# Patient Record
Sex: FEMALE | Race: WHITE | NOT HISPANIC OR LATINO | URBAN - METROPOLITAN AREA
[De-identification: names, ages, dates, MRNs, and addresses within clinical notes are randomized per-mention and may not be internally consistent; named-entity substitution may affect disease eponyms.]

---

## 2017-06-05 ENCOUNTER — IMPORTED ENCOUNTER (OUTPATIENT)
Dept: URBAN - METROPOLITAN AREA CLINIC 50 | Facility: CLINIC | Age: 72
End: 2017-06-05

## 2017-06-05 NOTE — PATIENT DISCUSSION
""""" ""Cataract(s) are not visually significant for cataract surgery.  Monitor by regular examinations. """

## 2018-06-04 ENCOUNTER — IMPORTED ENCOUNTER (OUTPATIENT)
Dept: URBAN - METROPOLITAN AREA CLINIC 50 | Facility: CLINIC | Age: 73
End: 2018-06-04

## 2019-07-15 ENCOUNTER — IMPORTED ENCOUNTER (OUTPATIENT)
Dept: URBAN - METROPOLITAN AREA CLINIC 50 | Facility: CLINIC | Age: 74
End: 2019-07-15

## 2019-07-15 NOTE — PATIENT DISCUSSION
"""Cataract(s) are not visually significant for cataract surgery. Monitor by regular examinations. "" ""Patient is happy with current glasses prescription or uncorrected [facility] and defers an Rx change.  """

## 2020-11-30 ENCOUNTER — IMPORTED ENCOUNTER (OUTPATIENT)
Dept: URBAN - METROPOLITAN AREA CLINIC 50 | Facility: CLINIC | Age: 75
End: 2020-11-30

## 2021-04-18 ASSESSMENT — VISUAL ACUITY
OD_CC: 20/25+2
OS_OTHER: 20/30. 20/40.
OD_OTHER: 20/20.
OD_CC: J1@ 21 IN
OS_CC: J1@ 15 IN
OD_BAT: 20/30
OD_CC: 20/20
OS_CC: J1+
OS_CC: 20/25-1
OD_CC: J1+
OS_BAT: 20/30
OD_OTHER: 20/30. 20/40.
OD_CC: 20/25-1
OS_OTHER: 20/20.
OS_CC: J1+
OD_CC: J1+
OD_CC: 20/25-1
OS_CC: 20/20
OS_CC: J1@ 21 IN
OD_CC: J1@ 15 IN
OS_CC: 20/25
OS_CC: 20/25+2

## 2021-04-18 ASSESSMENT — TONOMETRY
OD_IOP_MMHG: 15
OS_IOP_MMHG: 13
OS_IOP_MMHG: 16
OD_IOP_MMHG: 12
OS_IOP_MMHG: 15
OD_IOP_MMHG: 15
OD_IOP_MMHG: 18
OS_IOP_MMHG: 18

## 2021-11-09 ENCOUNTER — PREPPED CHART (OUTPATIENT)
Dept: URBAN - METROPOLITAN AREA CLINIC 52 | Facility: CLINIC | Age: 76
End: 2021-11-09

## 2022-11-17 ENCOUNTER — NEW PATIENT (OUTPATIENT)
Dept: URBAN - METROPOLITAN AREA CLINIC 52 | Facility: CLINIC | Age: 77
End: 2022-11-17

## 2022-11-17 DIAGNOSIS — H43.813: ICD-10-CM

## 2022-11-17 DIAGNOSIS — H04.123: ICD-10-CM

## 2022-11-17 DIAGNOSIS — H25.13: ICD-10-CM

## 2022-11-17 DIAGNOSIS — H52.4: ICD-10-CM

## 2022-11-17 PROCEDURE — 92014 COMPRE OPH EXAM EST PT 1/>: CPT

## 2022-11-17 PROCEDURE — 92015 DETERMINE REFRACTIVE STATE: CPT

## 2022-11-17 ASSESSMENT — VISUAL ACUITY
OU_CC: 20/25
OD_CC: 20/25
OS_PH: 20/25-1
OS_CC: 20/30-1
OD_GLARE: 20/25
OD_GLARE: 20/40
OS_GLARE: 20/25
OS_GLARE: 20/40

## 2022-11-17 ASSESSMENT — TONOMETRY
OD_IOP_MMHG: 17
OS_IOP_MMHG: 16

## 2022-11-17 ASSESSMENT — KERATOMETRY
OD_K2POWER_DIOPTERS: 44.00
OS_AXISANGLE_DEGREES: 14
OD_K1POWER_DIOPTERS: 43.00
OS_AXISANGLE2_DEGREES: 104
OD_AXISANGLE2_DEGREES: 60
OS_K2POWER_DIOPTERS: 44.25
OS_K1POWER_DIOPTERS: 43.25
OD_AXISANGLE_DEGREES: 150

## 2023-11-27 ENCOUNTER — COMPREHENSIVE EXAM (OUTPATIENT)
Dept: URBAN - METROPOLITAN AREA CLINIC 52 | Facility: CLINIC | Age: 78
End: 2023-11-27

## 2023-11-27 DIAGNOSIS — H25.13: ICD-10-CM

## 2023-11-27 DIAGNOSIS — H35.3131: ICD-10-CM

## 2023-11-27 DIAGNOSIS — H40.013: ICD-10-CM

## 2023-11-27 DIAGNOSIS — H04.123: ICD-10-CM

## 2023-11-27 DIAGNOSIS — H43.813: ICD-10-CM

## 2023-11-27 PROCEDURE — 92134 CPTRZ OPH DX IMG PST SGM RTA: CPT

## 2023-11-27 PROCEDURE — 99214 OFFICE O/P EST MOD 30 MIN: CPT

## 2023-11-27 PROCEDURE — 92015 DETERMINE REFRACTIVE STATE: CPT

## 2023-11-27 ASSESSMENT — VISUAL ACUITY
OS_CC: 20/25
OD_GLARE: 20/25
OD_GLARE: 20/25
OS_GLARE: 20/25
OS_GLARE: 20/25

## 2023-11-27 ASSESSMENT — TONOMETRY
OS_IOP_MMHG: 16
OD_IOP_MMHG: 17
OD_IOP_MMHG: 13
OS_IOP_MMHG: 13

## 2023-11-27 ASSESSMENT — KERATOMETRY
OD_K2POWER_DIOPTERS: 44.00
OS_K2POWER_DIOPTERS: 44.25
OD_AXISANGLE_DEGREES: 150
OS_AXISANGLE_DEGREES: 14
OS_K1POWER_DIOPTERS: 43.25
OD_AXISANGLE2_DEGREES: 60
OD_K1POWER_DIOPTERS: 43.00
OS_AXISANGLE2_DEGREES: 104

## 2024-05-13 ENCOUNTER — DIAGNOSTICS ONLY (OUTPATIENT)
Dept: URBAN - METROPOLITAN AREA CLINIC 52 | Facility: CLINIC | Age: 79
End: 2024-05-13

## 2024-05-13 DIAGNOSIS — H40.013: ICD-10-CM

## 2024-05-13 PROCEDURE — 92133 CPTRZD OPH DX IMG PST SGM ON: CPT

## 2024-05-13 PROCEDURE — 92083 EXTENDED VISUAL FIELD XM: CPT

## 2024-05-13 ASSESSMENT — KERATOMETRY
OS_AXISANGLE_DEGREES: 14
OD_AXISANGLE_DEGREES: 150
OD_AXISANGLE2_DEGREES: 60
OS_AXISANGLE2_DEGREES: 104
OS_K1POWER_DIOPTERS: 43.25
OS_K2POWER_DIOPTERS: 44.25
OD_K1POWER_DIOPTERS: 43.00
OD_K2POWER_DIOPTERS: 44.00

## 2024-12-02 ENCOUNTER — COMPREHENSIVE EXAM (OUTPATIENT)
Age: 79
End: 2024-12-02

## 2024-12-02 DIAGNOSIS — H04.123: ICD-10-CM

## 2024-12-02 DIAGNOSIS — H40.013: ICD-10-CM

## 2024-12-02 DIAGNOSIS — H25.13: ICD-10-CM

## 2024-12-02 DIAGNOSIS — H43.813: ICD-10-CM

## 2024-12-02 PROCEDURE — 92134 CPTRZ OPH DX IMG PST SGM RTA: CPT

## 2024-12-02 PROCEDURE — 99214 OFFICE O/P EST MOD 30 MIN: CPT
